# Patient Record
Sex: FEMALE | Race: WHITE | NOT HISPANIC OR LATINO
[De-identification: names, ages, dates, MRNs, and addresses within clinical notes are randomized per-mention and may not be internally consistent; named-entity substitution may affect disease eponyms.]

---

## 2018-05-29 PROBLEM — Z00.00 ENCOUNTER FOR PREVENTIVE HEALTH EXAMINATION: Status: ACTIVE | Noted: 2018-05-29

## 2018-06-05 ENCOUNTER — APPOINTMENT (OUTPATIENT)
Dept: OTOLARYNGOLOGY | Facility: CLINIC | Age: 73
End: 2018-06-05
Payer: MEDICARE

## 2018-06-05 VITALS
HEART RATE: 67 BPM | HEIGHT: 63 IN | SYSTOLIC BLOOD PRESSURE: 133 MMHG | DIASTOLIC BLOOD PRESSURE: 78 MMHG | BODY MASS INDEX: 35.44 KG/M2 | WEIGHT: 200 LBS

## 2018-06-05 DIAGNOSIS — E78.00 PURE HYPERCHOLESTEROLEMIA, UNSPECIFIED: ICD-10-CM

## 2018-06-05 DIAGNOSIS — Z78.9 OTHER SPECIFIED HEALTH STATUS: ICD-10-CM

## 2018-06-05 DIAGNOSIS — E11.9 TYPE 2 DIABETES MELLITUS W/OUT COMPLICATIONS: ICD-10-CM

## 2018-06-05 DIAGNOSIS — R00.1 BRADYCARDIA, UNSPECIFIED: ICD-10-CM

## 2018-06-05 DIAGNOSIS — K21.9 GASTRO-ESOPHAGEAL REFLUX DISEASE W/OUT ESOPHAGITIS: ICD-10-CM

## 2018-06-05 PROCEDURE — 99204 OFFICE O/P NEW MOD 45 MIN: CPT

## 2018-06-05 RX ORDER — AMLODIPINE BESYLATE 2.5 MG/1
2.5 TABLET ORAL
Refills: 0 | Status: ACTIVE | COMMUNITY

## 2018-06-05 RX ORDER — PANTOPRAZOLE 40 MG/1
40 TABLET, DELAYED RELEASE ORAL
Refills: 0 | Status: ACTIVE | COMMUNITY

## 2018-06-05 RX ORDER — LOSARTAN POTASSIUM 25 MG/1
25 TABLET, FILM COATED ORAL
Refills: 0 | Status: ACTIVE | COMMUNITY

## 2018-06-05 RX ORDER — CLOPIDOGREL BISULFATE 75 MG/1
75 TABLET, FILM COATED ORAL
Refills: 0 | Status: ACTIVE | COMMUNITY

## 2018-06-05 RX ORDER — METFORMIN HYDROCHLORIDE 500 MG/1
500 TABLET, COATED ORAL
Refills: 0 | Status: ACTIVE | COMMUNITY

## 2018-06-05 RX ORDER — SIMVASTATIN 10 MG/1
10 TABLET, FILM COATED ORAL
Refills: 0 | Status: ACTIVE | COMMUNITY

## 2018-06-05 RX ORDER — IRON HEME POLYPEPTIDE/FOLIC AC 12-1MG
125 MCG TABLET ORAL
Refills: 0 | Status: ACTIVE | COMMUNITY

## 2018-06-10 ENCOUNTER — FORM ENCOUNTER (OUTPATIENT)
Age: 73
End: 2018-06-10

## 2018-06-11 ENCOUNTER — APPOINTMENT (OUTPATIENT)
Dept: CT IMAGING | Facility: IMAGING CENTER | Age: 73
End: 2018-06-11
Payer: MEDICARE

## 2018-06-11 ENCOUNTER — OUTPATIENT (OUTPATIENT)
Dept: OUTPATIENT SERVICES | Facility: HOSPITAL | Age: 73
LOS: 1 days | End: 2018-06-11
Payer: MEDICARE

## 2018-06-11 DIAGNOSIS — D49.0 NEOPLASM OF UNSPECIFIED BEHAVIOR OF DIGESTIVE SYSTEM: ICD-10-CM

## 2018-06-11 PROCEDURE — 70487 CT MAXILLOFACIAL W/DYE: CPT | Mod: 26

## 2018-06-11 PROCEDURE — 70491 CT SOFT TISSUE NECK W/DYE: CPT | Mod: 26

## 2018-06-11 PROCEDURE — 70487 CT MAXILLOFACIAL W/DYE: CPT

## 2018-06-11 PROCEDURE — 82565 ASSAY OF CREATININE: CPT

## 2018-06-11 PROCEDURE — 70491 CT SOFT TISSUE NECK W/DYE: CPT

## 2018-06-14 ENCOUNTER — RESULT REVIEW (OUTPATIENT)
Age: 73
End: 2018-06-14

## 2018-07-05 ENCOUNTER — OUTPATIENT (OUTPATIENT)
Dept: OUTPATIENT SERVICES | Facility: HOSPITAL | Age: 73
LOS: 1 days | End: 2018-07-05

## 2018-07-05 VITALS
HEIGHT: 63 IN | WEIGHT: 199.96 LBS | HEART RATE: 76 BPM | TEMPERATURE: 98 F | OXYGEN SATURATION: 98 % | RESPIRATION RATE: 16 BRPM | DIASTOLIC BLOOD PRESSURE: 82 MMHG | SYSTOLIC BLOOD PRESSURE: 130 MMHG

## 2018-07-05 DIAGNOSIS — G47.33 OBSTRUCTIVE SLEEP APNEA (ADULT) (PEDIATRIC): ICD-10-CM

## 2018-07-05 DIAGNOSIS — D49.0 NEOPLASM OF UNSPECIFIED BEHAVIOR OF DIGESTIVE SYSTEM: ICD-10-CM

## 2018-07-05 DIAGNOSIS — I49.3 VENTRICULAR PREMATURE DEPOLARIZATION: ICD-10-CM

## 2018-07-05 DIAGNOSIS — D49.9 NEOPLASM OF UNSPECIFIED BEHAVIOR OF UNSPECIFIED SITE: ICD-10-CM

## 2018-07-05 DIAGNOSIS — K21.9 GASTRO-ESOPHAGEAL REFLUX DISEASE WITHOUT ESOPHAGITIS: ICD-10-CM

## 2018-07-05 DIAGNOSIS — E11.9 TYPE 2 DIABETES MELLITUS WITHOUT COMPLICATIONS: ICD-10-CM

## 2018-07-05 DIAGNOSIS — Z90.49 ACQUIRED ABSENCE OF OTHER SPECIFIED PARTS OF DIGESTIVE TRACT: Chronic | ICD-10-CM

## 2018-07-05 DIAGNOSIS — I83.90 ASYMPTOMATIC VARICOSE VEINS OF UNSPECIFIED LOWER EXTREMITY: Chronic | ICD-10-CM

## 2018-07-05 LAB
BLD GP AB SCN SERPL QL: NEGATIVE — SIGNIFICANT CHANGE UP
BUN SERPL-MCNC: 14 MG/DL — SIGNIFICANT CHANGE UP (ref 7–23)
CALCIUM SERPL-MCNC: 9.3 MG/DL — SIGNIFICANT CHANGE UP (ref 8.4–10.5)
CHLORIDE SERPL-SCNC: 105 MMOL/L — SIGNIFICANT CHANGE UP (ref 98–107)
CO2 SERPL-SCNC: 23 MMOL/L — SIGNIFICANT CHANGE UP (ref 22–31)
CREAT SERPL-MCNC: 0.76 MG/DL — SIGNIFICANT CHANGE UP (ref 0.5–1.3)
GLUCOSE SERPL-MCNC: 87 MG/DL — SIGNIFICANT CHANGE UP (ref 70–99)
HBA1C BLD-MCNC: 6.5 % — HIGH (ref 4–5.6)
HCT VFR BLD CALC: 40.1 % — SIGNIFICANT CHANGE UP (ref 34.5–45)
HGB BLD-MCNC: 13.3 G/DL — SIGNIFICANT CHANGE UP (ref 11.5–15.5)
MCHC RBC-ENTMCNC: 30.4 PG — SIGNIFICANT CHANGE UP (ref 27–34)
MCHC RBC-ENTMCNC: 33.2 % — SIGNIFICANT CHANGE UP (ref 32–36)
MCV RBC AUTO: 91.6 FL — SIGNIFICANT CHANGE UP (ref 80–100)
NRBC # FLD: 0 — SIGNIFICANT CHANGE UP
PLATELET # BLD AUTO: 189 K/UL — SIGNIFICANT CHANGE UP (ref 150–400)
PMV BLD: 10.5 FL — SIGNIFICANT CHANGE UP (ref 7–13)
POTASSIUM SERPL-MCNC: 3.8 MMOL/L — SIGNIFICANT CHANGE UP (ref 3.5–5.3)
POTASSIUM SERPL-SCNC: 3.8 MMOL/L — SIGNIFICANT CHANGE UP (ref 3.5–5.3)
RBC # BLD: 4.38 M/UL — SIGNIFICANT CHANGE UP (ref 3.8–5.2)
RBC # FLD: 12.9 % — SIGNIFICANT CHANGE UP (ref 10.3–14.5)
RH IG SCN BLD-IMP: NEGATIVE — SIGNIFICANT CHANGE UP
SODIUM SERPL-SCNC: 141 MMOL/L — SIGNIFICANT CHANGE UP (ref 135–145)
WBC # BLD: 6.66 K/UL — SIGNIFICANT CHANGE UP (ref 3.8–10.5)
WBC # FLD AUTO: 6.66 K/UL — SIGNIFICANT CHANGE UP (ref 3.8–10.5)

## 2018-07-05 NOTE — H&P PST ADULT - PMH
Bradycardia    Diabetes    GERD (gastroesophageal reflux disease)    Hypercholesterolemia    Neoplasm of soft palate    Skipped beats Bradycardia    Diabetes    GERD (gastroesophageal reflux disease)    Hypercholesterolemia    Neoplasm of soft palate    PVC (premature ventricular contraction)  x years

## 2018-07-05 NOTE — H&P PST ADULT - HISTORY OF PRESENT ILLNESS
Pt is a 73 y.o. 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

## 2018-07-05 NOTE — H&P PST ADULT - VISION (WITH CORRECTIVE LENSES IF THE PATIENT USUALLY WEARS THEM):
Normal vision: sees adequately in most situations; can see medication labels, newsprint/Reading and distance glasses

## 2018-07-05 NOTE — H&P PST ADULT - NSANTHOSAYNRD_GEN_A_CORE
pt and daughter state sleep study was done " negative for fei"/No. FEI screening performed.  STOP BANG Legend: 0-2 = LOW Risk; 3-4 = INTERMEDIATE Risk; 5-8 = HIGH Risk pt and daughter state sleep study was done/No. WILLA screening performed.  STOP BANG Legend: 0-2 = LOW Risk; 3-4 = INTERMEDIATE Risk; 5-8 = HIGH Risk

## 2018-07-05 NOTE — H&P PST ADULT - PROBLEM SELECTOR PLAN 1
Left Resection of palate     Pre op instructions reviewed with pt and daughter ; both appear to have a good understanding of pre op instructions    Pt states plavix dc d > 3 weeks ago etiology unknown ; pt on asa 81 mg ; as per Abelardo [place] ? DR Bahena pt may remain on asa pre op     Pt to Dr Rincon for pre op m/c     Pt to Dr Jag cabrera for pre op cardiac clearance ; call to s/w cardiologist not in ; unable to s/w PA

## 2018-07-05 NOTE — H&P PST ADULT - PROBLEM SELECTOR PLAN 2
Pt reports long h/o PVC ; also noted on cardiac clearance; pt denies cp, palpitations, sob  Pt in nad ; vss

## 2018-07-05 NOTE — H&P PST ADULT - NEGATIVE NEUROLOGICAL SYMPTOMS
no generalized seizures/no paresthesias/no syncope/no transient paralysis/no weakness/no focal seizures

## 2018-07-10 DIAGNOSIS — D49.0 NEOPLASM OF UNSPECIFIED BEHAVIOR OF DIGESTIVE SYSTEM: ICD-10-CM

## 2018-07-13 NOTE — ASU PATIENT PROFILE, ADULT - PMH
Bradycardia    Diabetes    GERD (gastroesophageal reflux disease)    Hypercholesterolemia    Neoplasm of soft palate    PVC (premature ventricular contraction)  x years

## 2018-07-15 ENCOUNTER — TRANSCRIPTION ENCOUNTER (OUTPATIENT)
Age: 73
End: 2018-07-15

## 2018-07-16 ENCOUNTER — INPATIENT (INPATIENT)
Facility: HOSPITAL | Age: 73
LOS: 0 days | Discharge: HOME | End: 2018-07-17
Attending: OTOLARYNGOLOGY | Admitting: OTOLARYNGOLOGY
Payer: MEDICARE

## 2018-07-16 ENCOUNTER — APPOINTMENT (OUTPATIENT)
Dept: OTOLARYNGOLOGY | Facility: HOSPITAL | Age: 73
End: 2018-07-16

## 2018-07-16 ENCOUNTER — RESULT REVIEW (OUTPATIENT)
Age: 73
End: 2018-07-16

## 2018-07-16 VITALS
HEIGHT: 63 IN | OXYGEN SATURATION: 97 % | HEART RATE: 61 BPM | TEMPERATURE: 98 F | SYSTOLIC BLOOD PRESSURE: 152 MMHG | WEIGHT: 199.96 LBS | RESPIRATION RATE: 14 BRPM | DIASTOLIC BLOOD PRESSURE: 70 MMHG

## 2018-07-16 DIAGNOSIS — D49.0 NEOPLASM OF UNSPECIFIED BEHAVIOR OF DIGESTIVE SYSTEM: ICD-10-CM

## 2018-07-16 DIAGNOSIS — I83.90 ASYMPTOMATIC VARICOSE VEINS OF UNSPECIFIED LOWER EXTREMITY: Chronic | ICD-10-CM

## 2018-07-16 DIAGNOSIS — Z90.49 ACQUIRED ABSENCE OF OTHER SPECIFIED PARTS OF DIGESTIVE TRACT: Chronic | ICD-10-CM

## 2018-07-16 LAB
GLUCOSE BLDC GLUCOMTR-MCNC: 107 MG/DL — HIGH (ref 70–99)
GLUCOSE BLDC GLUCOMTR-MCNC: 164 MG/DL — HIGH (ref 70–99)
GLUCOSE BLDC GLUCOMTR-MCNC: 224 MG/DL — HIGH (ref 70–99)
RH IG SCN BLD-IMP: NEGATIVE — SIGNIFICANT CHANGE UP

## 2018-07-16 PROCEDURE — 88331 PATH CONSLTJ SURG 1 BLK 1SPC: CPT | Mod: 26

## 2018-07-16 PROCEDURE — 88305 TISSUE EXAM BY PATHOLOGIST: CPT | Mod: 26

## 2018-07-16 PROCEDURE — 88307 TISSUE EXAM BY PATHOLOGIST: CPT | Mod: 26

## 2018-07-16 PROCEDURE — 42120 REMOVE PALATE/LESION: CPT | Mod: LT

## 2018-07-16 RX ORDER — SODIUM CHLORIDE 9 MG/ML
1000 INJECTION, SOLUTION INTRAVENOUS
Qty: 0 | Refills: 0 | Status: DISCONTINUED | OUTPATIENT
Start: 2018-07-16 | End: 2018-07-16

## 2018-07-16 RX ORDER — UBIDECARENONE 100 MG
1 CAPSULE ORAL
Qty: 0 | Refills: 0 | COMMUNITY

## 2018-07-16 RX ORDER — METFORMIN HYDROCHLORIDE 850 MG/1
1 TABLET ORAL
Qty: 0 | Refills: 0 | COMMUNITY

## 2018-07-16 RX ORDER — DEXTROSE 50 % IN WATER 50 %
15 SYRINGE (ML) INTRAVENOUS ONCE
Qty: 0 | Refills: 0 | Status: DISCONTINUED | OUTPATIENT
Start: 2018-07-16 | End: 2018-07-16

## 2018-07-16 RX ORDER — ERGOCALCIFEROL 1.25 MG/1
1 CAPSULE ORAL
Qty: 0 | Refills: 0 | COMMUNITY

## 2018-07-16 RX ORDER — ONDANSETRON 8 MG/1
4 TABLET, FILM COATED ORAL ONCE
Qty: 0 | Refills: 0 | Status: COMPLETED | OUTPATIENT
Start: 2018-07-16 | End: 2018-07-16

## 2018-07-16 RX ORDER — ICOSAPENT ETHYL 500 MG/1
2 CAPSULE, LIQUID FILLED ORAL
Qty: 0 | Refills: 0 | COMMUNITY

## 2018-07-16 RX ORDER — FAMOTIDINE 10 MG/ML
20 INJECTION INTRAVENOUS
Qty: 0 | Refills: 0 | Status: DISCONTINUED | OUTPATIENT
Start: 2018-07-16 | End: 2018-07-17

## 2018-07-16 RX ORDER — SIMVASTATIN 20 MG/1
1 TABLET, FILM COATED ORAL
Qty: 0 | Refills: 0 | COMMUNITY

## 2018-07-16 RX ORDER — MORPHINE SULFATE 50 MG/1
2 CAPSULE, EXTENDED RELEASE ORAL EVERY 6 HOURS
Qty: 0 | Refills: 0 | Status: DISCONTINUED | OUTPATIENT
Start: 2018-07-16 | End: 2018-07-17

## 2018-07-16 RX ORDER — SODIUM CHLORIDE 9 MG/ML
1000 INJECTION, SOLUTION INTRAVENOUS
Qty: 0 | Refills: 0 | Status: DISCONTINUED | OUTPATIENT
Start: 2018-07-16 | End: 2018-07-17

## 2018-07-16 RX ORDER — ASPIRIN/CALCIUM CARB/MAGNESIUM 324 MG
1 TABLET ORAL
Qty: 0 | Refills: 0 | COMMUNITY

## 2018-07-16 RX ORDER — DEXTROSE 50 % IN WATER 50 %
25 SYRINGE (ML) INTRAVENOUS ONCE
Qty: 0 | Refills: 0 | Status: DISCONTINUED | OUTPATIENT
Start: 2018-07-16 | End: 2018-07-16

## 2018-07-16 RX ORDER — PANTOPRAZOLE SODIUM 20 MG/1
1 TABLET, DELAYED RELEASE ORAL
Qty: 0 | Refills: 0 | COMMUNITY

## 2018-07-16 RX ORDER — GLUCAGON INJECTION, SOLUTION 0.5 MG/.1ML
1 INJECTION, SOLUTION SUBCUTANEOUS ONCE
Qty: 0 | Refills: 0 | Status: DISCONTINUED | OUTPATIENT
Start: 2018-07-16 | End: 2018-07-17

## 2018-07-16 RX ORDER — INSULIN LISPRO 100/ML
VIAL (ML) SUBCUTANEOUS
Qty: 0 | Refills: 0 | Status: DISCONTINUED | OUTPATIENT
Start: 2018-07-16 | End: 2018-07-16

## 2018-07-16 RX ORDER — HYDRALAZINE HCL 50 MG
10 TABLET ORAL EVERY 6 HOURS
Qty: 0 | Refills: 0 | Status: DISCONTINUED | OUTPATIENT
Start: 2018-07-16 | End: 2018-07-17

## 2018-07-16 RX ORDER — DEXTROSE 50 % IN WATER 50 %
12.5 SYRINGE (ML) INTRAVENOUS ONCE
Qty: 0 | Refills: 0 | Status: DISCONTINUED | OUTPATIENT
Start: 2018-07-16 | End: 2018-07-16

## 2018-07-16 RX ORDER — AMLODIPINE BESYLATE 2.5 MG/1
1 TABLET ORAL
Qty: 0 | Refills: 0 | COMMUNITY

## 2018-07-16 RX ORDER — INSULIN LISPRO 100/ML
VIAL (ML) SUBCUTANEOUS EVERY 6 HOURS
Qty: 0 | Refills: 0 | Status: DISCONTINUED | OUTPATIENT
Start: 2018-07-16 | End: 2018-07-17

## 2018-07-16 RX ORDER — HYDROMORPHONE HYDROCHLORIDE 2 MG/ML
0.5 INJECTION INTRAMUSCULAR; INTRAVENOUS; SUBCUTANEOUS
Qty: 0 | Refills: 0 | Status: DISCONTINUED | OUTPATIENT
Start: 2018-07-16 | End: 2018-07-17

## 2018-07-16 RX ORDER — MORPHINE SULFATE 50 MG/1
4 CAPSULE, EXTENDED RELEASE ORAL EVERY 6 HOURS
Qty: 0 | Refills: 0 | Status: DISCONTINUED | OUTPATIENT
Start: 2018-07-16 | End: 2018-07-17

## 2018-07-16 RX ADMIN — HYDROMORPHONE HYDROCHLORIDE 0.5 MILLIGRAM(S): 2 INJECTION INTRAMUSCULAR; INTRAVENOUS; SUBCUTANEOUS at 21:25

## 2018-07-16 RX ADMIN — ONDANSETRON 4 MILLIGRAM(S): 8 TABLET, FILM COATED ORAL at 21:17

## 2018-07-16 RX ADMIN — HYDROMORPHONE HYDROCHLORIDE 0.5 MILLIGRAM(S): 2 INJECTION INTRAMUSCULAR; INTRAVENOUS; SUBCUTANEOUS at 21:05

## 2018-07-16 RX ADMIN — Medication 2: at 23:42

## 2018-07-16 RX ADMIN — SODIUM CHLORIDE 100 MILLILITER(S): 9 INJECTION, SOLUTION INTRAVENOUS at 20:45

## 2018-07-16 RX ADMIN — HYDROMORPHONE HYDROCHLORIDE 0.5 MILLIGRAM(S): 2 INJECTION INTRAMUSCULAR; INTRAVENOUS; SUBCUTANEOUS at 21:40

## 2018-07-16 RX ADMIN — SODIUM CHLORIDE 100 MILLILITER(S): 9 INJECTION, SOLUTION INTRAVENOUS at 23:42

## 2018-07-16 RX ADMIN — HYDROMORPHONE HYDROCHLORIDE 0.5 MILLIGRAM(S): 2 INJECTION INTRAMUSCULAR; INTRAVENOUS; SUBCUTANEOUS at 21:26

## 2018-07-16 NOTE — ASU PREOP CHECKLIST - SELECT TESTS ORDERED
BMP/EKG/CBC/Type and Screen/Type and Cross EKG/POCT Blood Glucose/BMP/CBC/Type and Cross/Type and Screen

## 2018-07-17 ENCOUNTER — TRANSCRIPTION ENCOUNTER (OUTPATIENT)
Age: 73
End: 2018-07-17

## 2018-07-17 VITALS
OXYGEN SATURATION: 96 % | SYSTOLIC BLOOD PRESSURE: 124 MMHG | HEART RATE: 77 BPM | RESPIRATION RATE: 17 BRPM | DIASTOLIC BLOOD PRESSURE: 80 MMHG | TEMPERATURE: 98 F

## 2018-07-17 DIAGNOSIS — Z29.9 ENCOUNTER FOR PROPHYLACTIC MEASURES, UNSPECIFIED: ICD-10-CM

## 2018-07-17 DIAGNOSIS — I10 ESSENTIAL (PRIMARY) HYPERTENSION: ICD-10-CM

## 2018-07-17 DIAGNOSIS — R00.1 BRADYCARDIA, UNSPECIFIED: ICD-10-CM

## 2018-07-17 LAB
BUN SERPL-MCNC: 13 MG/DL — SIGNIFICANT CHANGE UP (ref 7–23)
CALCIUM SERPL-MCNC: 8.6 MG/DL — SIGNIFICANT CHANGE UP (ref 8.4–10.5)
CHLORIDE SERPL-SCNC: 103 MMOL/L — SIGNIFICANT CHANGE UP (ref 98–107)
CO2 SERPL-SCNC: 23 MMOL/L — SIGNIFICANT CHANGE UP (ref 22–31)
CREAT SERPL-MCNC: 0.64 MG/DL — SIGNIFICANT CHANGE UP (ref 0.5–1.3)
GLUCOSE BLDC GLUCOMTR-MCNC: 115 MG/DL — HIGH (ref 70–99)
GLUCOSE BLDC GLUCOMTR-MCNC: 121 MG/DL — HIGH (ref 70–99)
GLUCOSE BLDC GLUCOMTR-MCNC: 188 MG/DL — HIGH (ref 70–99)
GLUCOSE SERPL-MCNC: 183 MG/DL — HIGH (ref 70–99)
MAGNESIUM SERPL-MCNC: 1.9 MG/DL — SIGNIFICANT CHANGE UP (ref 1.6–2.6)
PHOSPHATE SERPL-MCNC: 3.6 MG/DL — SIGNIFICANT CHANGE UP (ref 2.5–4.5)
POTASSIUM SERPL-MCNC: 3.9 MMOL/L — SIGNIFICANT CHANGE UP (ref 3.5–5.3)
POTASSIUM SERPL-SCNC: 3.9 MMOL/L — SIGNIFICANT CHANGE UP (ref 3.5–5.3)
SODIUM SERPL-SCNC: 142 MMOL/L — SIGNIFICANT CHANGE UP (ref 135–145)

## 2018-07-17 PROCEDURE — 99223 1ST HOSP IP/OBS HIGH 75: CPT

## 2018-07-17 RX ORDER — CLOPIDOGREL BISULFATE 75 MG/1
1 TABLET, FILM COATED ORAL
Qty: 0 | Refills: 0 | COMMUNITY

## 2018-07-17 RX ORDER — ONDANSETRON 8 MG/1
4 TABLET, FILM COATED ORAL EVERY 4 HOURS
Qty: 0 | Refills: 0 | Status: DISCONTINUED | OUTPATIENT
Start: 2018-07-17 | End: 2018-07-17

## 2018-07-17 RX ADMIN — MORPHINE SULFATE 2 MILLIGRAM(S): 50 CAPSULE, EXTENDED RELEASE ORAL at 01:53

## 2018-07-17 RX ADMIN — FAMOTIDINE 104 MILLIGRAM(S): 10 INJECTION INTRAVENOUS at 06:26

## 2018-07-17 RX ADMIN — Medication 1: at 06:26

## 2018-07-17 RX ADMIN — Medication 100 MILLIGRAM(S): at 01:37

## 2018-07-17 RX ADMIN — MORPHINE SULFATE 2 MILLIGRAM(S): 50 CAPSULE, EXTENDED RELEASE ORAL at 01:38

## 2018-07-17 RX ADMIN — ONDANSETRON 4 MILLIGRAM(S): 8 TABLET, FILM COATED ORAL at 01:37

## 2018-07-17 RX ADMIN — Medication 100 MILLIGRAM(S): at 17:39

## 2018-07-17 RX ADMIN — Medication 100 MILLIGRAM(S): at 09:48

## 2018-07-17 RX ADMIN — FAMOTIDINE 104 MILLIGRAM(S): 10 INJECTION INTRAVENOUS at 17:39

## 2018-07-17 NOTE — SWALLOW BEDSIDE ASSESSMENT ADULT - COMMENTS
MD orders received. Chart reviewed. As per discussion with ENT PA (Chun), patient has yet to have obturator placed. PA to contact this department for full dysphagia evaluation once obturator has been placed.

## 2018-07-17 NOTE — DISCHARGE NOTE ADULT - PATIENT PORTAL LINK FT
You can access the [facility] Patient [facility], offered by [facility], by registering with the following website: [email or web address]

## 2018-07-17 NOTE — CONSULT NOTE ADULT - PROBLEM SELECTOR RECOMMENDATION 4
A1C 6.5,  on SS  Continue FSSS with Humalog coverage.  Monitor FS  may resume metformin at discharge.

## 2018-07-17 NOTE — PROGRESS NOTE ADULT - SUBJECTIVE AND OBJECTIVE BOX
Pt seen and examined this AM on rounds. No acute events overnight, pain well controlled.     NAD, awake and alert   Breathing comfortably on RA   EOMI, face symmetric   OC/OP: left upper palate with surgical excision site clean, mild oozing and post operative changes  Neck soft and flat     A/P: s/p excision of soft palate lesion on the left  -f/u path   -NPO/IVF  -SLP evaluation today   -replace obturator for patient  -pain control  -resume home meds  -advance diet per SLP   -hold plavix until 7/18  -f/u OMFS

## 2018-07-17 NOTE — DISCHARGE NOTE ADULT - CARE PLAN
Principal Discharge DX:	Neoplasm of soft palate  Goal:	excision of soft palate mass  Assessment and plan of treatment:	same

## 2018-07-17 NOTE — DISCHARGE NOTE ADULT - INSTRUCTIONS
soft diet only, always wear denture when eating. Salt winter rinses after every meal with dentures out.

## 2018-07-17 NOTE — CONSULT NOTE ADULT - ASSESSMENT
73 y.o. F with h/o DM2, HTN, bradycardia, obesity, admitted for resection of left palate mass. S/P surgery POD#1. Doing well.

## 2018-07-17 NOTE — DISCHARGE NOTE ADULT - HOSPITAL COURSE
73 year old male that presents to the hospital for excision of soft palate mass. Patient was started on diet and discharged home on 7/17/18.

## 2018-07-17 NOTE — CONSULT NOTE ADULT - SUBJECTIVE AND OBJECTIVE BOX
Patient is a 73y old  Female who presents with a chief complaint of excision of soft palate mass (17 Jul 2018 07:16)      HPI:  Pt is a 73 y.o. 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 S/P Surgery, POD#1. Currently, pt has no complaints. pain well controlled.  Denies any CP or SOB      History limited due to: [ ] Dementia  [ ] Delirium  [ ] Condition    Pain Symptoms if applicable:             	                         none	    Pain:	                            0	     Location:	  Modifying factors:	  Associated symptoms:	    Function: [ x] Independent  [ ] Assistance  [ ] Total care  [ ] Non-ambulatory    Allergies    No Known Allergies    Intolerances        HOME MEDICATIONS: [x ] Reviewed    MEDICATIONS  (STANDING):  clindamycin IVPB 600 milliGRAM(s) IV Intermittent every 8 hours  dextrose 5%. 1000 milliLiter(s) (50 mL/Hr) IV Continuous <Continuous>  famotidine  IVPB 20 milliGRAM(s) IV Intermittent two times a day  insulin lispro (HumaLOG) corrective regimen sliding scale   SubCutaneous every 6 hours  lactated ringers. 1000 milliLiter(s) (100 mL/Hr) IV Continuous <Continuous>    MEDICATIONS  (PRN):  glucagon  Injectable 1 milliGRAM(s) IntraMuscular once PRN Glucose LESS THAN 70 milligrams/deciliter  hydrALAZINE Injectable 10 milliGRAM(s) IV Push every 6 hours PRN SBP > 180  HYDROmorphone  Injectable 0.5 milliGRAM(s) IV Push every 10 minutes PRN Severe Pain (7 - 10)  morphine  - Injectable 2 milliGRAM(s) IV Push every 6 hours PRN Mild Pain (1 - 3)  morphine  - Injectable 4 milliGRAM(s) IV Push every 6 hours PRN Moderate to Severe pain  ondansetron Injectable 4 milliGRAM(s) IV Push every 4 hours PRN Nausea and/or Vomiting      PAST MEDICAL & SURGICAL HISTORY:  PVC (premature ventricular contraction): x years  Neoplasm of soft palate  Bradycardia  GERD (gastroesophageal reflux disease)  Diabetes  Hypercholesterolemia  Varicose veins: left  S/P appendectomy  [x ] Reviewed     SOCIAL HISTORY:  Residence: [ ] [facility]  [ ] SNF  [x ] Community  [ ] Substance abuse: denies  [ ] Tobacco: denies  [ ] Alcohol use: denies    FAMILY HISTORY:  No pertinent family history in first degree relatives  [ ] No pertinent family history in first degree relatives     REVIEW OF SYSTEMS:    CONSTITUTIONAL: No fever, weight loss, or fatigue  EYES: No eye pain, visual disturbances, or discharge  ENMT:  No difficulty hearing, tinnitus, vertigo; No sinus or throat pain  NECK: No pain or stiffness  BREASTS: No pain, masses, or nipple discharge  RESPIRATORY: No cough, wheezing, chills or hemoptysis; No shortness of breath  CARDIOVASCULAR: No chest pain, palpitations, dizziness, or leg swelling  GASTROINTESTINAL: No abdominal or epigastric pain. No nausea, vomiting, or hematemesis; No diarrhea or constipation. No melena or hematochezia.  GENITOURINARY: No dysuria, frequency, hematuria, or incontinence  NEUROLOGICAL: No headaches, memory loss, loss of strength, numbness, or tremors  SKIN: No itching, burning, rashes, or lesions   LYMPH NODES: No enlarged glands  ENDOCRINE: No heat or cold intolerance; No hair loss  MUSCULOSKELETAL: No muscle or back pain  PSYCHIATRIC: No depression, anxiety, mood swings, or difficulty sleeping  HEME/LYMPH: No easy bruising, or bleeding gums  ALLERGY AND IMMUNOLOGIC: No hives or eczema    [  ] All other ROS negative  [  ] Unable to obtain due to poor mental status    Vital Signs Last 24 Hrs  T(C): 36.8 (17 Jul 2018 10:21), Max: 36.9 (17 Jul 2018 06:23)  T(F): 98.3 (17 Jul 2018 10:21), Max: 98.5 (17 Jul 2018 06:23)  HR: 65 (17 Jul 2018 10:21) (61 - 91)  BP: 108/50 (17 Jul 2018 10:21) (108/50 - 158/68)  BP(mean): --  RR: 16 (17 Jul 2018 10:21) (13 - 20)  SpO2: 94% (17 Jul 2018 10:21) (93% - 99%)    PHYSICAL EXAM:    GENERAL: NAD, well-groomed, well-developed, obese  HEAD:  Atraumatic, Normocephalic  EYES: EOMI, PERRLA, conjunctiva and sclera clear  ENMT: Moist mucous membranes, (+) palate wound  NECK: Supple, No JVD  RESPIRATORY: Clear to auscultation bilaterally; No rales, rhonchi, wheezing, or rubs  CARDIOVASCULAR: Regular rate and rhythm at 60's; No murmurs, rubs, or gallops  GASTROINTESTINAL: Soft, Nontender, Nondistended; Bowel sounds present  GENITOURINARY: Not examined  EXTREMITIES:  2+ Peripheral Pulses, No clubbing, cyanosis, or edema  NERVOUS SYSTEM:  Alert & Oriented X3; Moving all 4 extremities; No gross sensory deficits  HEME/LYMPH: No lymphadenopathy noted  SKIN: No rashes or lesions; Incisions C/D/I    LABS:    07-17    142  |  103  |  13  ----------------------------<  183<H>  3.9   |  23  |  0.64    Ca    8.6      17 Jul 2018 06:15  Phos  3.6     07-17  Mg     1.9     07-17    A1C 6.5      CAPILLARY BLOOD GLUCOSE      POCT Blood Glucose.: 188 mg/dL (17 Jul 2018 06:13)      RADIOLOGY & ADDITIONAL STUDIES:    EKG:   Personally Reviewed:  [ X ] YES 7/13/18;  NSR at71. (+) flipped TW at I, AVL, V2-V6.    Imaging:   Personally Reviewed:  [ ] YES               Consultant(s) notes reviewed:    Care Discussed with Consultant(s)/Other Providers:    Advanced Directives: [ ] DNR  [ ] No feeding tube  [ ] MOLST in chart  [ ] MOLST completed today  [x ] Unknown

## 2018-07-17 NOTE — DISCHARGE NOTE ADULT - MEDICATION SUMMARY - MEDICATIONS TO TAKE
I will START or STAY ON the medications listed below when I get home from the hospital:    Percocet 5/325 oral tablet  -- 1 tab(s) by mouth every 6 hours MDD:4  -- Caution federal law prohibits the transfer of this drug to any person other  than the person for whom it was prescribed.  May cause drowsiness.  Alcohol may intensify this effect.  Use care when operating dangerous machinery.  This prescription cannot be refilled.  This product contains acetaminophen.  Do not use  with any other product containing acetaminophen to prevent possible liver damage.  Using more of this medication than prescribed may cause serious breathing problems.    -- Indication: For Pain    aspirin 81 mg oral tablet  -- 1 tab(s) by mouth once a day ; see plan of care   -- Indication: For Blood thinner     metFORMIN 500 mg oral tablet  -- 1 tab(s) by mouth 2 times a day  -- Indication: For Dm    simvastatin 10 mg oral tablet  -- 1 tab(s) by mouth once a day (at bedtime)  -- Indication: For cholesterol    Vascepa  -- 2 gram(s) by mouth 2 times a day; pt to hold 1 week pre op   -- Indication: For outpt med    amLODIPine 2.5 mg oral tablet  -- 1 tab(s) orally; pt takes prn   -- Indication: For HTN    CoQ10  -- 1 tab(s) by mouth once a day; dc d 7/05/18  -- Indication: For Supplement    pantoprazole 40 mg oral delayed release tablet  -- 1 tab(s) by mouth once a day, am  -- Indication: For GERD (gastroesophageal reflux disease)    Vitamin D2 50,000 intl units (1.25 mg) oral capsule  -- 1 cap(s) by mouth once a week, Saturday  -- Indication: For Supplement I will START or STAY ON the medications listed below when I get home from the hospital:    Percocet 5/325 oral tablet  -- 1 tab(s) by mouth every 6 hours MDD:4  -- Caution federal law prohibits the transfer of this drug to any person other  than the person for whom it was prescribed.  May cause drowsiness.  Alcohol may intensify this effect.  Use care when operating dangerous machinery.  This prescription cannot be refilled.  This product contains acetaminophen.  Do not use  with any other product containing acetaminophen to prevent possible liver damage.  Using more of this medication than prescribed may cause serious breathing problems.    -- Indication: For Pain    aspirin 81 mg oral tablet  -- 1 tab(s) by mouth once a day ; see plan of care   -- Indication: For Blood thinner     metFORMIN 500 mg oral tablet  -- 1 tab(s) by mouth 2 times a day  -- Indication: For Dm    simvastatin 10 mg oral tablet  -- 1 tab(s) by mouth once a day (at bedtime)  -- Indication: For cholesterol    Vascepa  -- 2 gram(s) by mouth 2 times a day; pt to hold 1 week pre op   -- Indication: For outpt med    Plavix 75 mg oral tablet  -- 1 tab(s) by mouth once a day  -- Indication: For Home med (restart tomorrow)    amLODIPine 2.5 mg oral tablet  -- 1 tab(s) orally; pt takes prn   -- Indication: For HTN    CoQ10  -- 1 tab(s) by mouth once a day; dc d 7/05/18  -- Indication: For Supplement    pantoprazole 40 mg oral delayed release tablet  -- 1 tab(s) by mouth once a day, am  -- Indication: For GERD (gastroesophageal reflux disease)    Vitamin D2 50,000 intl units (1.25 mg) oral capsule  -- 1 cap(s) by mouth once a week, Saturday  -- Indication: For Supplement

## 2018-07-17 NOTE — CONSULT NOTE ADULT - PROBLEM SELECTOR RECOMMENDATION 9
S/P Resection of left palate mass. POD#1.  management as per ENT  pain control  Bowel regimen  DVT prophylaxis  Incentive spirometer  OOB and ambulate

## 2018-07-17 NOTE — DISCHARGE NOTE ADULT - CARE PROVIDER_API CALL
Richard Bahena), [facility]; Otolaryngology  [address] [place] [address]  Phone: [phone]  Fax: [phone]

## 2018-07-17 NOTE — DISCHARGE NOTE ADULT - CONDITIONS AT DISCHARGE
Patient alert and orientedx4. Patient OOB ambulates, voids adequately. Patient's swallow test done at bedside by Dr Asael Rueda. PIV removed. Palate incision dry and intact.

## 2018-07-18 ENCOUNTER — INBOUND DOCUMENT (OUTPATIENT)
Age: 73
End: 2018-07-18

## 2018-07-19 PROBLEM — I49.3 VENTRICULAR PREMATURE DEPOLARIZATION: Chronic | Status: ACTIVE | Noted: 2018-07-05

## 2018-07-19 PROBLEM — K21.9 GASTRO-ESOPHAGEAL REFLUX DISEASE WITHOUT ESOPHAGITIS: Chronic | Status: ACTIVE | Noted: 2018-07-05

## 2018-07-19 PROBLEM — E78.00 PURE HYPERCHOLESTEROLEMIA, UNSPECIFIED: Chronic | Status: ACTIVE | Noted: 2018-07-05

## 2018-07-19 PROBLEM — R00.1 BRADYCARDIA, UNSPECIFIED: Chronic | Status: ACTIVE | Noted: 2018-07-05

## 2018-07-19 PROBLEM — E11.9 TYPE 2 DIABETES MELLITUS WITHOUT COMPLICATIONS: Chronic | Status: ACTIVE | Noted: 2018-07-05

## 2018-07-19 PROBLEM — D49.0 NEOPLASM OF UNSPECIFIED BEHAVIOR OF DIGESTIVE SYSTEM: Chronic | Status: ACTIVE | Noted: 2018-07-05

## 2018-07-24 ENCOUNTER — APPOINTMENT (OUTPATIENT)
Dept: OTOLARYNGOLOGY | Facility: CLINIC | Age: 73
End: 2018-07-24
Payer: MEDICARE

## 2018-07-24 VITALS — SYSTOLIC BLOOD PRESSURE: 125 MMHG | DIASTOLIC BLOOD PRESSURE: 77 MMHG

## 2018-07-24 DIAGNOSIS — Z86.39 PERSONAL HISTORY OF OTHER ENDOCRINE, NUTRITIONAL AND METABOLIC DISEASE: ICD-10-CM

## 2018-07-24 DIAGNOSIS — Z87.891 PERSONAL HISTORY OF NICOTINE DEPENDENCE: ICD-10-CM

## 2018-07-24 PROCEDURE — 99024 POSTOP FOLLOW-UP VISIT: CPT

## 2018-07-27 LAB — SURGICAL PATHOLOGY STUDY: SIGNIFICANT CHANGE UP

## 2018-10-23 ENCOUNTER — APPOINTMENT (OUTPATIENT)
Dept: OTOLARYNGOLOGY | Facility: CLINIC | Age: 73
End: 2018-10-23

## 2019-01-08 ENCOUNTER — APPOINTMENT (OUTPATIENT)
Dept: OTOLARYNGOLOGY | Facility: CLINIC | Age: 74
End: 2019-01-08
Payer: MEDICARE

## 2019-01-08 VITALS
HEART RATE: 76 BPM | HEIGHT: 63 IN | DIASTOLIC BLOOD PRESSURE: 81 MMHG | SYSTOLIC BLOOD PRESSURE: 136 MMHG | BODY MASS INDEX: 35.44 KG/M2 | WEIGHT: 200 LBS

## 2019-01-08 PROCEDURE — 99214 OFFICE O/P EST MOD 30 MIN: CPT

## 2019-01-08 NOTE — CONSULT LETTER
[Dear  ___] : Dear  [unfilled], [Courtesy Letter:] : I had the pleasure of seeing your patient, [unfilled], in my office today. [Please see my note below.] : Please see my note below. [Sincerely,] : Sincerely, [FreeTextEntry2] : Sarbjit Roca DMD ([place], [place]) [FreeTextEntry3] : Richard Bahena MD

## 2019-01-08 NOTE — HISTORY OF PRESENT ILLNESS
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 pt states for last 3 months feeling of something on palate. Denies pain, denies dysphagia.

## 2019-04-04 NOTE — PATIENT PROFILE ADULT. - FUNCTIONAL SCREEN CURRENT LEVEL: TOILETING, MLM
April 4, 2019      Marleen Tam  [address]  [facility] [address]        Dear Ms. Tam,    The results of your colonoscopy performed on 04/02/2019 have returned and indicate the following:    · Tubular Adenoma Polyp(s)  Tubular adenomas are growths of tissue in the colon that can be pre-cancerous.  If these polyps are not removed, over time they can lead to colon cancer.  Although your polyp(s) were removed during the procedure, these types of polyps can reoccur and other polyps may develop.    It is important that you be re-screened to remove any future polyps that may be pre-cancerous in nature. Colonoscopies remain the best examination for follow-up with patients who have had polyps removed.      My office will send you a reminder letter when it is time for you to schedule another colon screening procedure in 5 years.     It has been a pleasure caring for you. If you have questions or concerns regarding these test results or your plan of care, please feel free to contact us. You may either contact us by phone, MyChart or schedule a follow-up office visit to go over these results.      Sincerely,    Enzo Nowak MD  [facility] GI Department  [address]  [place] [address]  [phone]    
      April 4, 2019      Marleen Tam  [address]  [facility] [address]        Dear Ms. Tam,    The results of your colonoscopy performed on 04/02/2019 have returned and indicate the following:    · Tubular Adenoma Polyp(s)  Tubular adenomas are growths of tissue in the colon that can be pre-cancerous.  If these polyps are not removed, over time they can lead to colon cancer.  Although your polyp(s) were removed during the procedure, these types of polyps can reoccur and other polyps may develop.    It is important that you be re-screened to remove any future polyps that may be pre-cancerous in nature. Colonoscopies remain the best examination for follow-up with patients who have had polyps removed.      My office will send you a reminder letter when it is time for you to schedule another colon screening procedure in 5 years.     It has been a pleasure caring for you. If you have questions or concerns regarding these test results or your plan of care, please feel free to contact us. You may either contact us by phone, MyChart or schedule a follow-up office visit to go over these results.      Sincerely,    Philly Pennington DO  [facility] GI Department  [address]  [place] [address]  [phone]    
(0) independent

## 2019-08-06 ENCOUNTER — APPOINTMENT (OUTPATIENT)
Dept: OTOLARYNGOLOGY | Facility: CLINIC | Age: 74
End: 2019-08-06
Payer: MEDICARE

## 2019-08-06 VITALS
WEIGHT: 200 LBS | BODY MASS INDEX: 35.44 KG/M2 | HEART RATE: 73 BPM | RESPIRATION RATE: 16 BRPM | HEIGHT: 63 IN | DIASTOLIC BLOOD PRESSURE: 79 MMHG | SYSTOLIC BLOOD PRESSURE: 118 MMHG

## 2019-08-06 DIAGNOSIS — D49.0 NEOPLASM OF UNSPECIFIED BEHAVIOR OF DIGESTIVE SYSTEM: ICD-10-CM

## 2019-08-06 PROCEDURE — 99214 OFFICE O/P EST MOD 30 MIN: CPT

## 2019-08-06 NOTE — CONSULT LETTER
[Dear  ___] : Dear  [unfilled], [Courtesy Letter:] : I had the pleasure of seeing your patient, [unfilled], in my office today. [Please see my note below.] : Please see my note below. [FreeTextEntry2] : Sarbjit Roca DMD ([place], [place]) [Sincerely,] : Sincerely, [FreeTextEntry3] : Richard Bahena MD

## 2019-08-06 NOTE — PHYSICAL EXAM
[Nodule] : nodule [FreeTextEntry1] : ? lower parotid body nodule [Midline] : trachea located in midline position [Normal] : no rashes

## 2019-08-06 NOTE — REASON FOR VISIT
[Subsequent Evaluation] : a subsequent evaluation for [FreeTextEntry2] : tomor of the hard and soft palate

## 2019-08-06 NOTE — HISTORY OF PRESENT ILLNESS
[None] : The patient is currently asymptomatic. [Neck Mass] : no neck mass [de-identified] : Ms. 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  Patient reports a feeling of something stuck in her throat for the last 2 months as well as bilateral ear pain (more L>R) and a feeling of moisture in the ears for the past 3 months.  Denies painful swallowing, sore throat, or difficulty eating.  She wears the dentures and notes that her dentures does not fit as well as it did before and causes some discomfort. [Difficulty Swallowing] : no difficulty swallowing [Painful Swallowing] : no painful swallowing

## 2019-08-20 ENCOUNTER — FORM ENCOUNTER (OUTPATIENT)
Age: 74
End: 2019-08-20

## 2019-08-21 ENCOUNTER — APPOINTMENT (OUTPATIENT)
Dept: ULTRASOUND IMAGING | Facility: IMAGING CENTER | Age: 74
End: 2019-08-21
Payer: MEDICARE

## 2019-08-21 ENCOUNTER — OUTPATIENT (OUTPATIENT)
Dept: OUTPATIENT SERVICES | Facility: HOSPITAL | Age: 74
LOS: 1 days | End: 2019-08-21
Payer: MEDICARE

## 2019-08-21 DIAGNOSIS — D49.0 NEOPLASM OF UNSPECIFIED BEHAVIOR OF DIGESTIVE SYSTEM: ICD-10-CM

## 2019-08-21 DIAGNOSIS — Z90.49 ACQUIRED ABSENCE OF OTHER SPECIFIED PARTS OF DIGESTIVE TRACT: Chronic | ICD-10-CM

## 2019-08-21 DIAGNOSIS — I83.90 ASYMPTOMATIC VARICOSE VEINS OF UNSPECIFIED LOWER EXTREMITY: Chronic | ICD-10-CM

## 2019-08-21 PROCEDURE — 76536 US EXAM OF HEAD AND NECK: CPT | Mod: 26

## 2019-08-21 PROCEDURE — 76536 US EXAM OF HEAD AND NECK: CPT

## 2019-08-23 ENCOUNTER — RESULT REVIEW (OUTPATIENT)
Age: 74
End: 2019-08-23

## 2021-02-16 ENCOUNTER — APPOINTMENT (OUTPATIENT)
Dept: OTOLARYNGOLOGY | Facility: CLINIC | Age: 76
End: 2021-02-16

## 2021-04-23 ENCOUNTER — EMERGENCY (EMERGENCY)
Facility: HOSPITAL | Age: 76
LOS: 1 days | Discharge: HOME | End: 2021-04-23
Attending: EMERGENCY MEDICINE
Payer: MEDICARE

## 2021-04-23 VITALS
RESPIRATION RATE: 16 BRPM | DIASTOLIC BLOOD PRESSURE: 75 MMHG | TEMPERATURE: 99 F | OXYGEN SATURATION: 98 % | HEART RATE: 66 BPM | SYSTOLIC BLOOD PRESSURE: 128 MMHG

## 2021-04-23 VITALS
HEIGHT: 63 IN | OXYGEN SATURATION: 96 % | TEMPERATURE: 98 F | DIASTOLIC BLOOD PRESSURE: 69 MMHG | WEIGHT: 119.93 LBS | SYSTOLIC BLOOD PRESSURE: 153 MMHG | HEART RATE: 93 BPM

## 2021-04-23 DIAGNOSIS — Z90.49 ACQUIRED ABSENCE OF OTHER SPECIFIED PARTS OF DIGESTIVE TRACT: Chronic | ICD-10-CM

## 2021-04-23 DIAGNOSIS — I83.90 ASYMPTOMATIC VARICOSE VEINS OF UNSPECIFIED LOWER EXTREMITY: Chronic | ICD-10-CM

## 2021-04-23 PROCEDURE — M0243: CPT

## 2021-04-23 PROCEDURE — 96360 HYDRATION IV INFUSION INIT: CPT

## 2021-04-23 PROCEDURE — 99282 EMERGENCY DEPT VISIT SF MDM: CPT | Mod: CS

## 2021-04-23 PROCEDURE — 99284 EMERGENCY DEPT VISIT MOD MDM: CPT | Mod: 25

## 2021-04-23 RX ORDER — SODIUM CHLORIDE 9 MG/ML
250 INJECTION INTRAMUSCULAR; INTRAVENOUS; SUBCUTANEOUS
Refills: 0 | Status: DISCONTINUED | OUTPATIENT
Start: 2021-04-23 | End: 2021-04-26

## 2021-04-23 RX ADMIN — SODIUM CHLORIDE 250 MILLILITER(S): 9 INJECTION INTRAMUSCULAR; INTRAVENOUS; SUBCUTANEOUS at 14:19

## 2021-04-23 RX ADMIN — SODIUM CHLORIDE 25 MILLILITER(S): 9 INJECTION INTRAMUSCULAR; INTRAVENOUS; SUBCUTANEOUS at 12:13

## 2021-04-23 NOTE — ED PROVIDER NOTE - PATIENT PORTAL LINK FT
You can access the FollowMyHealth Patient Portal offered by [facility] by registering at the following website: [email or web address]. By joining [employer]’s FollowMyHealth portal, you will also be able to view your health information using other applications (apps) compatible with our system.

## 2021-04-23 NOTE — ED PROVIDER NOTE - OBJECTIVE STATEMENT
77 y/o female with PMHx of HLD, DM, GERD, presents for MAB infusion.  pt was flu like symoptoms for 3-4 days,  Pt had COVID test done 4/21 which was positive.  pt was referred by PMD for infusion.  Denies chest pain or shortness of breath.

## 2021-04-23 NOTE — ED ADULT NURSE NOTE - OBJECTIVE STATEMENT
States she has cough , body aches x4 days . States she has cough , body aches x4 days . States she was tested positive for COVID-19 yesterday and returned today for anti body infusion .

## 2022-02-01 NOTE — ASU PREOP CHECKLIST - VERIFY SURGICAL SITE/SIDE WITH PATIENT
Left resection of palate/done Normal vision: sees adequately in most situations; can see medication labels, newsprint

## 2024-06-18 NOTE — H&P PST ADULT - BLOOD AVOIDANCE/RESTRICTIONS, PROFILE
Patient is being referred to a orthopedics. Please schedule accordingly.    [facility]   [phone]     none

## 2024-09-19 ENCOUNTER — EMERGENCY (EMERGENCY)
Facility: HOSPITAL | Age: 79
LOS: 1 days | Discharge: HOME | End: 2024-09-19
Attending: EMERGENCY MEDICINE
Payer: MEDICARE

## 2024-09-19 VITALS
WEIGHT: 130.07 LBS | OXYGEN SATURATION: 100 % | DIASTOLIC BLOOD PRESSURE: 62 MMHG | HEIGHT: 63 IN | RESPIRATION RATE: 18 BRPM | SYSTOLIC BLOOD PRESSURE: 106 MMHG | TEMPERATURE: 99 F | HEART RATE: 61 BPM

## 2024-09-19 DIAGNOSIS — Z90.49 ACQUIRED ABSENCE OF OTHER SPECIFIED PARTS OF DIGESTIVE TRACT: Chronic | ICD-10-CM

## 2024-09-19 DIAGNOSIS — I83.90 ASYMPTOMATIC VARICOSE VEINS OF UNSPECIFIED LOWER EXTREMITY: Chronic | ICD-10-CM

## 2024-09-19 PROCEDURE — 90715 TDAP VACCINE 7 YRS/> IM: CPT

## 2024-09-19 PROCEDURE — 90471 IMMUNIZATION ADMIN: CPT

## 2024-09-19 PROCEDURE — 99284 EMERGENCY DEPT VISIT MOD MDM: CPT

## 2024-09-19 PROCEDURE — 99283 EMERGENCY DEPT VISIT LOW MDM: CPT | Mod: 25

## 2024-09-19 RX ORDER — AMOXICILLIN AND CLAVULANATE POTASSIUM 250; 125 MG/1; MG/1
875 TABLET, FILM COATED ORAL
Qty: 14 | Refills: 0
Start: 2024-09-19 | End: 2024-09-25

## 2024-09-19 RX ORDER — TETANUS TOXOID, REDUCED DIPHTHERIA TOXOID AND ACELLULAR PERTUSSIS VACCINE, ADSORBED 5; 2.5; 8; 8; 2.5 [IU]/.5ML; [IU]/.5ML; UG/.5ML; UG/.5ML; UG/.5ML
0.5 SUSPENSION INTRAMUSCULAR ONCE
Refills: 0 | Status: COMPLETED | OUTPATIENT
Start: 2024-09-19 | End: 2024-09-19

## 2024-09-19 RX ORDER — AMOXICILLIN AND CLAVULANATE POTASSIUM 250; 125 MG/1; MG/1
1 TABLET, FILM COATED ORAL ONCE
Refills: 0 | Status: COMPLETED | OUTPATIENT
Start: 2024-09-19 | End: 2024-09-19

## 2024-09-19 RX ADMIN — AMOXICILLIN AND CLAVULANATE POTASSIUM 1 TABLET(S): 250; 125 TABLET, FILM COATED ORAL at 14:57

## 2024-09-19 RX ADMIN — TETANUS TOXOID, REDUCED DIPHTHERIA TOXOID AND ACELLULAR PERTUSSIS VACCINE, ADSORBED 0.5 MILLILITER(S): 5; 2.5; 8; 8; 2.5 SUSPENSION INTRAMUSCULAR at 14:57

## 2024-09-19 NOTE — ED PROVIDER NOTE - PATIENT'S PREFERRED PRONOUN
Her/She Include Z78.9 (Other Specified Conditions Influencing Health Status) As An Associated Diagnosis?: No Kenalog Preparation: Kenalog Consent: The risks of atrophy were reviewed with the patient. Validate Note Data When Using Inventory: Yes Medical Necessity Clause: This procedure was medically necessary because the lesions that were treated were: Detail Level: Detailed X Size Of Lesion In Cm (Optional): 0 Administered By (Optional): Renzo Total Volume Injected (Ccs- Only Use Numbers And Decimals): 2.0 Concentration Of Solution Injected (Mg/Ml): 5.0

## 2024-09-19 NOTE — ED PROVIDER NOTE - PATIENT PORTAL LINK FT
You can access the FollowMyHealth Patient Portal offered by [facility] by registering at the following website: [email or web address]. By joining [employer]’s FollowMyHealth portal, you will also be able to view your health information using other applications (apps) compatible with our system.

## 2024-09-19 NOTE — ED PROVIDER NOTE - ANIMAL IMMUNIZATION STATUS
Zurdo Dash is a 70 year old male who calls with right knee pain and swelling.    NURSING ASSESSMENT:  Description:  Right knee pain and swelling. He thinks this is d/t his arthritis. He is unable to get in with rheumatology this week. Denies any other symptoms. Stated that he was triaged by specialty nurse yesterday and advised to ice the knee and take ibuprofen and to f/u with PCP if not better  Onset/duration:  Yesterday morning  Precip. factors:    Associated symptoms:    Improves/worsens symptoms:    Pain scale (0-10)     LMP/preg/breast feeding:    Last exam/Treatment:  1/16/17  Allergies: No Known Allergies    MEDICATIONS:   Taking medication(s) as prescribed? N/A  Taking over the counter medication(s?) N/A  Any medication side effects? Not Applicable    Any barriers to taking medication(s) as prescribed?  N/A  Medication(s) improving/managing symptoms?  N/A  Medication reconciliation completed: N/A      NURSING PLAN: Nursing advice to patient to be seen for further eval by PCP    RECOMMENDED DISPOSITION:  See above. Appointment made for tomorrow  Will comply with recommendation: Yes  If further questions/concerns or if symptoms do not improve, worsen or new symptoms develop, call your PCP or [facility] as soon as possible.      Guideline used:  Telephone Triage Protocols for Nurses, Fourth Edition, Elisabeth Del Castillo RN       up to date